# Patient Record
Sex: FEMALE | Race: WHITE | NOT HISPANIC OR LATINO | Employment: OTHER | URBAN - METROPOLITAN AREA
[De-identification: names, ages, dates, MRNs, and addresses within clinical notes are randomized per-mention and may not be internally consistent; named-entity substitution may affect disease eponyms.]

---

## 2017-01-20 ENCOUNTER — HOSPITAL ENCOUNTER (OUTPATIENT)
Dept: RADIOLOGY | Facility: HOSPITAL | Age: 67
Discharge: HOME | End: 2017-01-20
Attending: INTERNAL MEDICINE
Payer: OTHER GOVERNMENT

## 2017-01-20 ENCOUNTER — OFFICE VISIT (OUTPATIENT)
Dept: INTERNAL MEDICINE | Facility: CLINIC | Age: 67
End: 2017-01-20
Payer: OTHER GOVERNMENT

## 2017-01-20 VITALS
HEART RATE: 77 BPM | DIASTOLIC BLOOD PRESSURE: 82 MMHG | WEIGHT: 203.5 LBS | HEIGHT: 67 IN | TEMPERATURE: 98 F | BODY MASS INDEX: 31.94 KG/M2 | OXYGEN SATURATION: 97 % | SYSTOLIC BLOOD PRESSURE: 160 MMHG

## 2017-01-20 DIAGNOSIS — M77.8 TENDONITIS OF FINGER: Primary | ICD-10-CM

## 2017-01-20 DIAGNOSIS — M77.8 TENDONITIS OF FINGER: ICD-10-CM

## 2017-01-20 PROCEDURE — 99214 OFFICE O/P EST MOD 30 MIN: CPT | Mod: PBBFAC | Performed by: INTERNAL MEDICINE

## 2017-01-20 PROCEDURE — 99213 OFFICE O/P EST LOW 20 MIN: CPT | Mod: S$PBB,,, | Performed by: INTERNAL MEDICINE

## 2017-01-20 PROCEDURE — 73130 X-RAY EXAM OF HAND: CPT | Mod: TC,LT

## 2017-01-20 PROCEDURE — 99999 PR PBB SHADOW E&M-EST. PATIENT-LVL IV: CPT | Mod: PBBFAC,,, | Performed by: INTERNAL MEDICINE

## 2017-01-20 PROCEDURE — 73130 X-RAY EXAM OF HAND: CPT | Mod: 26,LT,, | Performed by: RADIOLOGY

## 2017-01-20 RX ORDER — OXYCODONE AND ACETAMINOPHEN 10; 325 MG/1; MG/1
1 TABLET ORAL EVERY 4 HOURS PRN
Qty: 30 TABLET | Refills: 0 | Status: SHIPPED | OUTPATIENT
Start: 2017-01-20 | End: 2017-01-30

## 2017-01-20 NOTE — PROGRESS NOTES
Subjective:       Patient ID: Soumya Stoll is a 66 y.o. female.    Chief Complaint: Tendonitis (in thumb)    Tendonitis   This is a recurrent problem. The current episode started in the past 7 days. The problem occurs 2 to 4 times per day. The problem has been gradually worsening. Pertinent negatives include no abdominal pain, arthralgias, chest pain, chills, congestion, coughing, fatigue, fever, headaches, nausea, rash, sore throat, vomiting or weakness. Exacerbated by: gripping with left hand. She has tried nothing for the symptoms. The treatment provided no relief.     Review of Systems   Constitutional: Negative for activity change, chills, fatigue and fever.   HENT: Negative for congestion, ear pain, nosebleeds, postnasal drip, sinus pressure and sore throat.    Eyes: Negative.  Negative for visual disturbance.   Respiratory: Negative for cough, chest tightness, shortness of breath and wheezing.    Cardiovascular: Negative for chest pain.   Gastrointestinal: Negative for abdominal pain, diarrhea, nausea and vomiting.   Genitourinary: Negative for difficulty urinating, dysuria, frequency and urgency.   Musculoskeletal: Negative for arthralgias and neck stiffness.   Skin: Negative for rash.   Neurological: Negative for dizziness, weakness and headaches.   Psychiatric/Behavioral: Negative for sleep disturbance. The patient is not nervous/anxious.        Objective:      Physical Exam   Musculoskeletal:        Hands:      Assessment:       1. Tendonitis of finger        Plan:   Soumya was seen today for tendonitis.    Diagnoses and all orders for this visit:    Tendonitis of finger  -     Ambulatory consult to Orthopedics  -     X-Ray Hand 3 view Left; Future    Other orders  -     oxycodone-acetaminophen (PERCOCET)  mg per tablet; Take 1 tablet by mouth every 4 (four) hours as needed for Pain.

## 2017-01-20 NOTE — MR AVS SNAPSHOT
[facility]  [address]  [facility] [address]  Phone: [phone]  Fax: [phone]                  Soumya Stoll   2017 12:00 PM   Office Visit    Description:  Female : 1950   Provider:  Laurence Meredith MD   Department:  [facility] Medicine           Reason for Visit     Tendonitis           Diagnoses this Visit        Comments    Tendonitis of finger    -  Primary            To Do List           Future Appointments        Provider Department Dept Phone    2017 1:30 PM [facility] [id number] [address] [facility] [phone]    2017 1:00 PM Sherwin Woodson Jr., MD [facility] [phone]      Goals (5 Years of Data)     None       These Medications        Disp Refills Start End    oxycodone-acetaminophen (PERCOCET)  mg per tablet 30 tablet 0 2017    Take 1 tablet by mouth every 4 (four) hours as needed for Pain. - Oral    Pharmacy: [facility] [address] Ph #: [phone]         [facility]     [facility] Care Line -  Assistance  Registered nurses in the [facility] provide clinical advisement, health education, appointment booking, and other advisory services.  Call for this free service at [phone].             Medications           Message regarding Medications     Verify the changes and/or additions to your medication regime listed below are the same as discussed with your clinician today.  If any of these changes or additions are incorrect, please notify your healthcare provider.        START taking these NEW medications        Refills    oxycodone-acetaminophen (PERCOCET)  mg per tablet 0    Sig: Take 1 tablet by mouth every 4 (four) hours as needed for Pain.    Class: Normal    Route: Oral      STOP taking these medications     hydrocodone-acetaminophen 7.5-325mg (NORCO) 7.5-325 mg per tablet Take 1  "tablet by mouth every 6 (six) hours as needed for Pain.           Verify that the below list of medications is an accurate representation of the medications you are currently taking.  If none reported, the list may be blank. If incorrect, please contact your healthcare provider. Carry this list with you in case of emergency.           Current Medications     aspirin (ECOTRIN) 325 MG EC tablet Take 1 tablet (325 mg total) by mouth 2 (two) times daily.    metoprolol succinate (TOPROL-XL) 50 MG 24 hr tablet Take 1 tablet (50 mg total) by mouth 2 (two) times daily.    clobetasol (TEMOVATE) 0.05 % external solution As needed for itching.    clotrimazole-betamethasone 1-0.05% (LOTRISONE) cream Apply topically 2 (two) times daily.    oxycodone-acetaminophen (PERCOCET)  mg per tablet Take 1 tablet by mouth every 4 (four) hours as needed for Pain.           Clinical Reference Information           Vital Signs - Last Recorded  Most recent update: 1/20/2017 12:01 PM by Matias Day LPN    BP Pulse Temp Ht Wt SpO2    (!) 160/82 (BP Location: Left arm, Patient Position: Sitting, BP Method: Manual) 77 97.9 °F (36.6 °C) (Oral) 5' 6.5" (1.689 m) 92.3 kg (203 lb 7.8 oz) 97%    BMI                32.35 kg/m2          Blood Pressure          Most Recent Value    BP  (!)  160/82      Allergies as of 1/20/2017     No Known Allergies      Immunizations Administered on Date of Encounter - 1/20/2017     None      Orders Placed During Today's Visit      Normal Orders This Visit    Ambulatory consult to Orthopedics     Future Labs/Procedures Expected by Expires    X-Ray Hand 3 view Left  1/20/2017 4/20/2017      "

## 2017-01-23 ENCOUNTER — OFFICE VISIT (OUTPATIENT)
Dept: ORTHOPEDICS | Facility: CLINIC | Age: 67
End: 2017-01-23
Payer: OTHER GOVERNMENT

## 2017-01-23 VITALS — HEIGHT: 67 IN | WEIGHT: 203 LBS | BODY MASS INDEX: 31.86 KG/M2

## 2017-01-23 DIAGNOSIS — M18.0 PRIMARY OSTEOARTHRITIS OF BOTH FIRST CARPOMETACARPAL JOINTS: Primary | ICD-10-CM

## 2017-01-23 PROCEDURE — 20600 DRAIN/INJ JOINT/BURSA W/O US: CPT | Mod: FA,S$PBB,, | Performed by: ORTHOPAEDIC SURGERY

## 2017-01-23 PROCEDURE — 20600 DRAIN/INJ JOINT/BURSA W/O US: CPT | Mod: PBBFAC,PO | Performed by: ORTHOPAEDIC SURGERY

## 2017-01-23 PROCEDURE — 99203 OFFICE O/P NEW LOW 30 MIN: CPT | Mod: 25,S$PBB,, | Performed by: ORTHOPAEDIC SURGERY

## 2017-01-23 PROCEDURE — 99999 PR PBB SHADOW E&M-EST. PATIENT-LVL II: CPT | Mod: PBBFAC,,, | Performed by: ORTHOPAEDIC SURGERY

## 2017-01-23 PROCEDURE — 99212 OFFICE O/P EST SF 10 MIN: CPT | Mod: PBBFAC,PO | Performed by: ORTHOPAEDIC SURGERY

## 2017-01-23 RX ORDER — TRIAMCINOLONE ACETONIDE 40 MG/ML
40 INJECTION, SUSPENSION INTRA-ARTICULAR; INTRAMUSCULAR
Status: COMPLETED | OUTPATIENT
Start: 2017-01-23 | End: 2017-01-23

## 2017-01-23 RX ADMIN — TRIAMCINOLONE ACETONIDE 40 MG: 40 INJECTION, SUSPENSION INTRA-ARTICULAR; INTRAMUSCULAR at 01:01

## 2017-01-23 NOTE — Clinical Note
January 23, 2017      Laurence Meredith MD  [address]  [facility] [address]           [facility]  [address] [place] [address] [address]  [facility] [address]  Phone: [phone]          Patient: Soumya Stoll   MR Number: [id number]   YOB: 1950   Date of Visit: 1/23/2017       Dear Dr. Laurence Meredith:    Thank you for referring Soumya Stoll to me for evaluation. Attached you will find relevant portions of my assessment and plan of care.    If you have questions, please do not hesitate to call me. I look forward to following Soumya Stoll along with you.    Sincerely,    Sherwin Woodson Jr., MD    Enclosure  CC:  No Recipients    If you would like to receive this communication electronically, please contact [email or web address] or [phone] to request more information on [employer] Link access.    For providers and/or their staff who would like to refer a patient to [facility], please contact us through our one-stop-shop provider referral line, [facility] , at [phone].    If you feel you have received this communication in error or would no longer like to receive these types of communications, please e-mail [email or web address]

## 2017-01-23 NOTE — LETTER
January 23, 2017        Laurence Meredith MD  [address]  [facility] [address]             [facility]  [address] [place] [address] [address]  [facility] [address]  Phone: [phone]   Patient: Soumya Stoll   MR Number: [id number]   YOB: 1950   Date of Visit: 1/23/2017       Dear Dr. Meredith:    Thank you for referring Soumya Stoll to me for evaluation. Below are the relevant portions of my assessment and plan of care.            If you have questions, please do not hesitate to call me. I look forward to following Soumya along with you.    Sincerely,      Sherwin Woodson Jr., MD           CC  No Recipients

## 2017-01-23 NOTE — PROGRESS NOTES
INITIAL VISIT HISTORY:  A 66-year-old female presents for evaluation of   bilateral hand pain, worse on the left hand compared to the right.  She   localizes most of the pain to the base of the left thumb.  Symptoms are worse   with use, they have been present for the past several months.  No numbness or   tingling is reported.  No trauma reported.    PAST MEDICAL HISTORY:  Significant for diverticulosis, hyperlipidemia,   hypertension.    PAST SURGICAL HISTORY:  Includes total hip replacement, hip arthroscopy,   colonoscopy, partial knee replacement and shoulder surgery, right.    FAMILY HISTORY:  Positive for colon cancer, hyperlipidemia and hypertension.    SOCIAL HISTORY:  The patient does not smoke, drinks one beer per day.    REVIEW OF SYSTEMS:  Negative for fever, chills, rashes.    CURRENT MEDICATIONS:  Reviewed and on chart.    ALLERGIES:  None.    PHYSICAL EXAMINATION:  GENERAL:  Well-developed, well-nourished female in no acute distress, alert and   oriented x3.  EXTREMITIES:  Examination of the upper extremities significant for the hands,   demonstrating tenderness at the base of each thumb, left worse than right.    Positive grind test on the left, negative on the right.  No crepitation either   hand.  Range of motion in wrist and fingers full, bilateral.  Tinel sign is   negative bilateral.  Phalen test negative.  Skin color and temperature normal.    Sensation intact in all digits.   strength slightly decreased on the left.    X-RAYS:  AP and lateral, left hand demonstrate arthritic change at the base of   the left thumb at the CMC joint.    IMPRESSION:  Left thumb carpometacarpal arthritis, moderate.    PLAN:  I explained the nature of problem to the patient.  Recommended and   performed an injection at the base of the left thumb with combination of Kenalog   10 mg, 0.5 mL Xylocaine, sterile technique.  I have also fitted her for a thumb   wrap for support.  Recommended anti-inflammatory  medication by mouth.  Follow   up in one to two months for recheck.      AJYDON/NEFTALI  dd: 01/23/2017 13:20:32 (CST)  td: 01/24/2017 04:54:33 (CST)  Doc ID   #[id number]  Job ID #[id number]    CC:

## 2017-02-25 ENCOUNTER — HOSPITAL ENCOUNTER (EMERGENCY)
Facility: HOSPITAL | Age: 67
Discharge: HOME | End: 2017-02-25
Attending: EMERGENCY MEDICINE
Payer: OTHER GOVERNMENT

## 2017-02-25 VITALS
SYSTOLIC BLOOD PRESSURE: 190 MMHG | DIASTOLIC BLOOD PRESSURE: 90 MMHG | HEIGHT: 66 IN | HEART RATE: 73 BPM | TEMPERATURE: 98 F | OXYGEN SATURATION: 100 % | BODY MASS INDEX: 32.14 KG/M2 | WEIGHT: 200 LBS | RESPIRATION RATE: 18 BRPM

## 2017-02-25 DIAGNOSIS — K57.92 DIVERTICULITIS OF INTESTINE WITHOUT PERFORATION OR ABSCESS WITHOUT BLEEDING, UNSPECIFIED PART OF INTESTINAL TRACT: Primary | ICD-10-CM

## 2017-02-25 LAB
ALBUMIN SERPL BCP-MCNC: 4 G/DL
ALP SERPL-CCNC: 122 U/L
ALT SERPL W/O P-5'-P-CCNC: 36 U/L
ANION GAP SERPL CALC-SCNC: 9 MMOL/L
AST SERPL-CCNC: 20 U/L
BASOPHILS # BLD AUTO: 0.02 K/UL
BASOPHILS NFR BLD: 0.3 %
BILIRUB SERPL-MCNC: 1 MG/DL
BILIRUB UR QL STRIP: NEGATIVE
BUN SERPL-MCNC: 9 MG/DL
CALCIUM SERPL-MCNC: 9.4 MG/DL
CHLORIDE SERPL-SCNC: 106 MMOL/L
CLARITY UR REFRACT.AUTO: CLEAR
CO2 SERPL-SCNC: 25 MMOL/L
COLOR UR AUTO: YELLOW
CREAT SERPL-MCNC: 0.8 MG/DL
DIFFERENTIAL METHOD: ABNORMAL
EOSINOPHIL # BLD AUTO: 0 K/UL
EOSINOPHIL NFR BLD: 0.4 %
ERYTHROCYTE [DISTWIDTH] IN BLOOD BY AUTOMATED COUNT: 13.5 %
EST. GFR  (AFRICAN AMERICAN): >60 ML/MIN/1.73 M^2
EST. GFR  (NON AFRICAN AMERICAN): >60 ML/MIN/1.73 M^2
GLUCOSE SERPL-MCNC: 114 MG/DL
GLUCOSE UR QL STRIP: NEGATIVE
HCT VFR BLD AUTO: 44.7 %
HGB BLD-MCNC: 15.2 G/DL
HGB UR QL STRIP: NEGATIVE
KETONES UR QL STRIP: NEGATIVE
LEUKOCYTE ESTERASE UR QL STRIP: NEGATIVE
LIPASE SERPL-CCNC: 15 U/L
LYMPHOCYTES # BLD AUTO: 1.7 K/UL
LYMPHOCYTES NFR BLD: 20.9 %
MCH RBC QN AUTO: 31.9 PG
MCHC RBC AUTO-ENTMCNC: 34 %
MCV RBC AUTO: 94 FL
MONOCYTES # BLD AUTO: 0.6 K/UL
MONOCYTES NFR BLD: 7.9 %
NEUTROPHILS # BLD AUTO: 5.6 K/UL
NEUTROPHILS NFR BLD: 70.2 %
NITRITE UR QL STRIP: NEGATIVE
PH UR STRIP: 5 [PH] (ref 5–8)
PLATELET # BLD AUTO: 241 K/UL
PMV BLD AUTO: 10.5 FL
POTASSIUM SERPL-SCNC: 4.3 MMOL/L
PROT SERPL-MCNC: 7.8 G/DL
PROT UR QL STRIP: NEGATIVE
RBC # BLD AUTO: 4.76 M/UL
SODIUM SERPL-SCNC: 140 MMOL/L
SP GR UR STRIP: 1 (ref 1–1.03)
URN SPEC COLLECT METH UR: NORMAL
UROBILINOGEN UR STRIP-ACNC: NEGATIVE EU/DL
WBC # BLD AUTO: 7.96 K/UL

## 2017-02-25 PROCEDURE — 83690 ASSAY OF LIPASE: CPT

## 2017-02-25 PROCEDURE — 99284 EMERGENCY DEPT VISIT MOD MDM: CPT

## 2017-02-25 PROCEDURE — 25000003 PHARM REV CODE 250: Performed by: PHYSICIAN ASSISTANT

## 2017-02-25 PROCEDURE — 99284 EMERGENCY DEPT VISIT MOD MDM: CPT | Mod: ,,, | Performed by: PHYSICIAN ASSISTANT

## 2017-02-25 PROCEDURE — 85025 COMPLETE CBC W/AUTO DIFF WBC: CPT

## 2017-02-25 PROCEDURE — 80053 COMPREHEN METABOLIC PANEL: CPT

## 2017-02-25 PROCEDURE — 81003 URINALYSIS AUTO W/O SCOPE: CPT

## 2017-02-25 RX ORDER — CIPROFLOXACIN 500 MG/1
500 TABLET ORAL 2 TIMES DAILY
Qty: 20 TABLET | Refills: 0 | Status: SHIPPED | OUTPATIENT
Start: 2017-02-25 | End: 2017-03-07

## 2017-02-25 RX ORDER — ONDANSETRON 4 MG/1
4 TABLET, ORALLY DISINTEGRATING ORAL EVERY 8 HOURS PRN
Qty: 12 TABLET | Refills: 0 | Status: SHIPPED | OUTPATIENT
Start: 2017-02-25 | End: 2017-04-19

## 2017-02-25 RX ORDER — METRONIDAZOLE 500 MG/1
500 TABLET ORAL 3 TIMES DAILY
Qty: 21 TABLET | Refills: 0 | Status: SHIPPED | OUTPATIENT
Start: 2017-02-25 | End: 2017-03-07

## 2017-02-25 RX ORDER — IBUPROFEN 600 MG/1
600 TABLET ORAL
Status: COMPLETED | OUTPATIENT
Start: 2017-02-25 | End: 2017-02-25

## 2017-02-25 RX ADMIN — IBUPROFEN 600 MG: 600 TABLET, FILM COATED ORAL at 12:02

## 2017-02-25 NOTE — ED AVS SNAPSHOT
[facility]  [address] [facility] [address]               Soumya Stoll   2017 10:53 AM   ED    Description:  Female : 1950   Department:  [facility]           Your Care was Coordinated By:     Provider Role From To    Remigio Westbrook III, MD Attending Provider 17 [id number] --    TIANA Santos Physician Assistant 17 [id number] --      Reason for Visit     Diverticulitis           Diagnoses this Visit        Comments    Diverticulitis of intestine without perforation or abscess without bleeding, unspecified part of intestinal tract    -  Primary       ED Disposition     None           To Do List           Follow-up Information     Schedule an appointment as soon as possible for a visit with Lily Dhaliwal MD.    Specialty:  Internal Medicine    Contact information:    [address]  [place] [address]  [phone]          Schedule an appointment as soon as possible for a visit with [facility].    Specialty:  Colon and Rectal Surgery    Contact information:    [address] [facility] [address] [phone]    Additional information:    [facility] - 4th Floor       These Medications        Disp Refills Start End    ciprofloxacin HCl (CIPRO) 500 MG tablet 20 tablet 0 2017 3/7/2017    Take 1 tablet (500 mg total) by mouth 2 (two) times daily. - Oral    Pharmacy: [facility] [address] [place] [address] Ph #: [phone]       metronidazole (FLAGYL) 500 MG tablet 21 tablet 0 2017 3/7/2017    Take 1 tablet (500 mg total) by mouth 3 (three) times daily. - Oral    Pharmacy: [facility] [place], [place] - [address] [place] [address] Ph #: [phone]       ondansetron (ZOFRAN-ODT) 4 MG TbDL 12 tablet 0 2017     Take 1 tablet (4 mg total) by mouth every 8 (eight) hours as needed (nausea). - Oral    Pharmacy: [facility]  [place], [place] - [address] Ph #: [phone]         [facility]     [facility] Nurse [facility] - 24/7 Assistance  Registered nurses in the [facility] provide clinical advisement, health education, appointment booking, and other advisory services.  Call for this free service at [phone].             Medications           Message regarding Medications     Verify the changes and/or additions to your medication regime listed below are the same as discussed with your clinician today.  If any of these changes or additions are incorrect, please notify your healthcare provider.        START taking these NEW medications        Refills    ciprofloxacin HCl (CIPRO) 500 MG tablet 0    Sig: Take 1 tablet (500 mg total) by mouth 2 (two) times daily.    Class: Print    Route: Oral    metronidazole (FLAGYL) 500 MG tablet 0    Sig: Take 1 tablet (500 mg total) by mouth 3 (three) times daily.    Class: Print    Route: Oral    ondansetron (ZOFRAN-ODT) 4 MG TbDL 0    Sig: Take 1 tablet (4 mg total) by mouth every 8 (eight) hours as needed (nausea).    Class: Print    Route: Oral      These medications were administered today        Dose Freq    ibuprofen tablet 600 mg 600 mg ED 1 Time    Sig: Take 1 tablet (600 mg total) by mouth ED 1 Time.    Class: Normal    Route: Oral           Verify that the below list of medications is an accurate representation of the medications you are currently taking.  If none reported, the list may be blank. If incorrect, please contact your healthcare provider. Carry this list with you in case of emergency.           Current Medications     metoprolol succinate (TOPROL-XL) 50 MG 24 hr tablet Take 1 tablet (50 mg total) by mouth 2 (two) times daily.    aspirin (ECOTRIN) 325 MG EC tablet Take 1 tablet (325 mg total) by mouth 2 (two) times daily.    ciprofloxacin HCl (CIPRO) 500 MG tablet Take 1 tablet (500 mg total) by mouth 2 (two) times daily.    metronidazole (FLAGYL)  500 MG tablet Take 1 tablet (500 mg total) by mouth 3 (three) times daily.    ondansetron (ZOFRAN-ODT) 4 MG TbDL Take 1 tablet (4 mg total) by mouth every 8 (eight) hours as needed (nausea).           Clinical Reference Information           Your Vitals Were     BP                   190/90 (BP Location: Left arm, Patient Position: Sitting)           Allergies as of 2/25/2017     No Known Allergies      Immunizations Administered on Date of Encounter - 2/25/2017     None      ED Micro, Lab, POCT     Start Ordered       Status Ordering Provider    02/25/17 1116 02/25/17 1115  CBC auto differential  STAT      Final result     02/25/17 1116 02/25/17 1115  Comprehensive metabolic panel  STAT      Final result     02/25/17 1116 02/25/17 1115  Lipase  STAT      Final result     02/25/17 1116 02/25/17 1115  Urinalysis  STAT      Final result       ED Imaging Orders     None        Discharge Instructions         Discharge Instructions for Diverticulitis  You have been diagnosed with diverticulitis. This is a condition in which small pouches form in your colon (large intestine) and become inflamed or infected. Follow the guidelines below for home care.  As you recover  Tips for recovery include:  · Eat a low-fiber diet. Your healthcare provider may advise a liquid diet. This gives your bowel a chance to rest so that it can recover.  · Foods to include: flake cereal, mashed potatoes, pancakes, waffles, pasta, white bread, rice, applesauce, bananas, eggs, fish, poultry, tofu, and well-cooked vegetables  · Take your medicines as directed. Do not stop taking the medicines, even if you feel better.  · Monitor your temperature and report any rising temperature to your healthcare provider.  · Take antibiotics exactly as directed. Do not miss any and keep taking them even if you feel better.   · Drink 6 to 8 glasses of water every day, unless directed otherwise.  · Use a heating pad or hot water bottle to reduce abdominal cramping or  pain.  Preventing diverticulitis in the future  Tips for prevention include:  · Eat a high-fiber diet. Fiber adds bulk to the stool so that it passes through the large intestine more easily.  · Keep drinking 6 to 8 glasses of water every day, unless directed otherwise.  · Begin an exercise program. Ask your healthcare provider how to get started. You can benefit from simple activities such as walking or gardening.  · Treat diarrhea with a bland diet. Start with liquids only, then slowly add fiber over time.  · Watch for changes in your bowel movements (constipation to diarrhea).  · Avoid constipation with fiber and add a stool softener if needed.   · Get plenty of rest and sleep.  Follow-up care  Make a follow-up appointment as directed by our staff.  When to call your healthcare provider  Call your healthcare provider immediately if you have any of the following:  · Fever of 100.4°F (38.0°C) or higher, or as directed by your healthcare provider  · Chills  · Severe cramps in the belly, most commonly the lower left side  · Tenderness in the belly, most commonly the lower left side  · Nausea and vomiting  · Bleeding from your rectum   Date Last Reviewed: 7/1/2016  © 3322-0166 [employer]. [address] [place] [address]. All rights reserved. This information is not intended as a substitute for professional medical care. Always follow your healthcare professional's instructions.          Your Scheduled Appointments     Mar 06, 2017  1:00 PM CST   Established Patient Visit with MD Joel Vilchis Jr. - Orthopedics (Joel)    [address] [place] [address] [address]  [facility] [id number] [phone]               [facility] complies with applicable Federal civil rights laws and does not discriminate on the basis of race, color, national origin, age, disability, or sex.        Language Assistance Services     ATTENTION: Language assistance services are available, free of  charge. Please call [phone].      ATENCIÓN: Si habla español, tiene a craig disposición servicios gratuitos de asistencia lingüística. Llame al [phone].     CHÚ Ý: N?u b?n nói Ti?ng Vi?t, có các d?ch v? h? tr? ngôn ng? mi?n phí dành cho b?n. G?i s? [phone].

## 2017-02-25 NOTE — ED TRIAGE NOTES
Pt presents with c/o diverticulitis with symptoms x 1 day. Pt reports abd pain to LLQ, stomach cramps and bloating. Pt denies N/V/D or fever. States she has not had a flare up in years. Pt has a normal BM today.

## 2017-02-25 NOTE — ED PROVIDER NOTES
Encounter Date: 2/25/2017    SCRIBE #1 NOTE: I, Cecilio Pressley, am scribing for, and in the presence of,  Remigio Westbrook MD. I have scribed the following portions of the note - the APC attestation.       History     Chief Complaint   Patient presents with    Diverticulitis     c/o diverticulitis     Review of patient's allergies indicates:  No Known Allergies  HPI Comments: This is a 66-year-old female with a past medical history of hypertension, hyperlipidemia, diverticulosis who presents to the ED with a chief complaint of abdominal pain.  Patient reports gradual onset of cramping 4/10 left lower quadrant abdominal pain yesterday morning.  The pain has been constant since the onset and is nonradiating. She reports having a normal formed BM this morning. Patient denies fever, chills, chest pain, shortness of breath, nausea/vomiting, appetite changes, dysuria, hematuria, frequency, difficulty urinating, diarrhea, melena or BRBPR.    Patient has a history of colonoscopy revealing diverticulosis, last performed 5 years ago.  She has had one episode of diverticulitis in the past.  Patient reports a travel history to [place] and [place] in the last month.  No recent antibiotic use.    The history is provided by the patient.     Past Medical History:   Diagnosis Date    Diverticulosis     Fever blister     Hyperlipidemia     Hypertension     IGT (impaired glucose tolerance) 4/1/2016    Keloid cicatrix      Past Surgical History:   Procedure Laterality Date    COLONOSCOPY  2013    repeat in 5 yrs    FOOT NEUROMA SURGERY Left     HIP ARTHROSCOPY W/ LABRAL DEBRIDEMENT Left 2008    JOINT REPLACEMENT Right     2014    partial knee replacement Left     SHOULDER SURGERY      TOTAL HIP ARTHROPLASTY Right 2013     Family History   Problem Relation Age of Onset    Cancer Father 70     colon CA    Hypertension Father     Hyperlipidemia Father     Cancer Paternal Aunt 75     COLON CA    Cancer Maternal Grandmother       colon CA    Cancer Sister 55     breast CA    Cancer Brother 45     colon CA    Ovarian cancer Neg Hx     Colon cancer Neg Hx      Social History   Substance Use Topics    Smoking status: Never Smoker    Smokeless tobacco: None    Alcohol use 0.0 oz/week     0 Standard drinks or equivalent per week      Comment: daily 2 beers and 1 glass of wine     Review of Systems   Constitutional: Negative for chills and fever.   HENT: Negative for sore throat and voice change.    Respiratory: Negative for shortness of breath.    Cardiovascular: Negative for chest pain.   Gastrointestinal: Positive for abdominal pain (left lower quadrant). Negative for constipation, diarrhea, nausea and vomiting.   Genitourinary: Negative for dysuria.   Musculoskeletal: Negative for arthralgias, back pain and myalgias.   Skin: Negative for rash.   Neurological: Negative for dizziness, weakness and light-headedness.   Hematological: Does not bruise/bleed easily.       Physical Exam   Initial Vitals   BP Pulse Resp Temp SpO2   02/25/17 0857 02/25/17 0857 02/25/17 0857 02/25/17 0857 02/25/17 0857   190/90 73 18 98 °F (36.7 °C) 100 %     Physical Exam    Constitutional: She appears well-developed and well-nourished. No distress.   HENT:   Head: Atraumatic.   Eyes: Conjunctivae and EOM are normal. Pupils are equal, round, and reactive to light.   Neck: Normal range of motion. Neck supple.   Cardiovascular: Normal rate, regular rhythm and normal heart sounds.   Pulmonary/Chest: Breath sounds normal. No respiratory distress. She has no wheezes. She has no rhonchi. She has no rales.   Abdominal: Soft. Bowel sounds are normal. She exhibits distension (mild). There is tenderness in the right lower quadrant and left lower quadrant. There is no rigidity, no rebound, no guarding and no CVA tenderness.   Neurological: She is alert and oriented to person, place, and time.   Skin: Skin is warm and dry. No rash noted.         ED Course    Procedures  Labs Reviewed   CBC W/ AUTO DIFFERENTIAL - Abnormal; Notable for the following:        Result Value    MCH 31.9 (*)     All other components within normal limits   COMPREHENSIVE METABOLIC PANEL - Abnormal; Notable for the following:     Glucose 114 (*)     All other components within normal limits   LIPASE   URINALYSIS        Imaging Results     None             Medical Decision Making:   History:   Old Medical Records: I decided to obtain old medical records.  Clinical Tests:   Lab Tests: Ordered and Reviewed       APC / Resident Notes:   66-year-old female presents with left lower quadrant abdominal pain.  On exam she is afebrile and nontoxic.  Abdomen is soft, mildly distended, and tender in the right and left lower quadrants.  There is no rebound, guarding, or rigidity.  No CVA tenderness.  Patient is well-appearing and in no acute distress.    DDX includes but is not limited to diverticulitis, UTI, gastroenteritis.    Discussed workup with patient including labs and recommended CT abdomen and pelvis.  Patient prefers a more conservative approach and would like to decline imaging at this time.  Feel that given patient's stable clinical appearance this is reasonable.  I will check labs and start on Cipro and Flagyl.      Labs with normal WBC, electrolytes, and renal function. Urinalysis benign.    Discussed with the patient that if she should not improve or if she has any worsening symptoms she will need to return promptly for further evaluation.  Patient agrees with the plan and course of treatment.  She verbalized understanding and agreement to return for worsening symptoms. I discussed the care of this patient with my supervising MD.        Scribe Attestation:   Scribe #1: I performed the above scribed service and the documentation accurately describes the services I performed. I attest to the accuracy of the note.    Attending Attestation:     Physician Attestation Statement for NP/PA:   I  discussed this assessment and plan of this patient with the NP/PA, but I did not personally examine the patient. The face to face encounter was performed by the NP/PA.    Other NP/PA Attestation Additions:    History of Present Illness: LLQ abdominal pain         Physician Attestation for Scribe:  Physician Attestation Statement for Scribe #1: I, Remigio Westbrook MD, reviewed documentation, as scribed by Cecilio Pressley in my presence, and it is both accurate and complete.                 ED Course     Clinical Impression:   The encounter diagnosis was Diverticulitis of intestine without perforation or abscess without bleeding, unspecified part of intestinal tract.    Disposition:   Disposition: Discharged  Condition: Stable       TIANA Santos  02/25/17 9996       Remigio Westbrook III, MD  02/25/17 3730

## 2017-02-25 NOTE — ED NOTES
LOC: The patient is awake and alert; oriented x 3 and speaking appropriately.  APPEARANCE: Patient resting comfortably, patient is clean and well groomed  SKIN: warm and dry, normal skin turgor & moist mucus membranes, skin intact, no breakdown noted.  MUSCULOSKELETAL: Patient moving all extremities well, no obvious swelling or deformities noted  RESPIRATORY: Airway is open and patent, breath sounds clear throughout all lung fields; respirations are spontaneous, normal effort and rate  CARDIAC: Patient has a normal rate, no peripheral edema noted, capillary refill < 3 seconds; No complaints of chest pain   ABDOMEN: Soft and tender to palpation LLQ, distention noted. Bowel sounds present x 4. Pt reports last BM was today and normal.

## 2017-03-01 ENCOUNTER — PATIENT MESSAGE (OUTPATIENT)
Dept: INTERNAL MEDICINE | Facility: CLINIC | Age: 67
End: 2017-03-01

## 2017-03-01 DIAGNOSIS — Z12.11 COLON CANCER SCREENING: Primary | ICD-10-CM

## 2017-03-01 DIAGNOSIS — Z87.19 HISTORY OF DIVERTICULITIS: ICD-10-CM

## 2017-03-27 RX ORDER — METOPROLOL SUCCINATE 50 MG/1
TABLET, EXTENDED RELEASE ORAL
Qty: 180 TABLET | Refills: 3 | Status: SHIPPED | OUTPATIENT
Start: 2017-03-27 | End: 2017-04-03 | Stop reason: SDUPTHER

## 2017-04-03 RX ORDER — METOPROLOL SUCCINATE 50 MG/1
TABLET, EXTENDED RELEASE ORAL
Qty: 180 TABLET | Refills: 3 | Status: SHIPPED | OUTPATIENT
Start: 2017-04-03 | End: 2017-05-01 | Stop reason: SDUPTHER

## 2017-04-03 RX ORDER — METOPROLOL SUCCINATE 50 MG/1
50 TABLET, EXTENDED RELEASE ORAL 2 TIMES DAILY
Qty: 180 TABLET | Refills: 3 | Status: CANCELLED | OUTPATIENT
Start: 2017-04-03

## 2017-04-19 ENCOUNTER — OFFICE VISIT (OUTPATIENT)
Dept: INTERNAL MEDICINE | Facility: CLINIC | Age: 67
End: 2017-04-19
Payer: OTHER GOVERNMENT

## 2017-04-19 VITALS
BODY MASS INDEX: 32.66 KG/M2 | WEIGHT: 203.25 LBS | SYSTOLIC BLOOD PRESSURE: 135 MMHG | HEIGHT: 66 IN | TEMPERATURE: 98 F | HEART RATE: 69 BPM | DIASTOLIC BLOOD PRESSURE: 83 MMHG | RESPIRATION RATE: 17 BRPM

## 2017-04-19 DIAGNOSIS — Z12.11 COLON CANCER SCREENING: ICD-10-CM

## 2017-04-19 DIAGNOSIS — Z00.00 ANNUAL PHYSICAL EXAM: Primary | ICD-10-CM

## 2017-04-19 DIAGNOSIS — M25.511 CHRONIC PAIN OF BOTH SHOULDERS: ICD-10-CM

## 2017-04-19 DIAGNOSIS — G89.29 CHRONIC PAIN OF BOTH SHOULDERS: ICD-10-CM

## 2017-04-19 DIAGNOSIS — I10 BENIGN ESSENTIAL HYPERTENSION: ICD-10-CM

## 2017-04-19 DIAGNOSIS — E78.5 HYPERLIPIDEMIA, UNSPECIFIED HYPERLIPIDEMIA TYPE: ICD-10-CM

## 2017-04-19 DIAGNOSIS — M25.512 CHRONIC PAIN OF BOTH SHOULDERS: ICD-10-CM

## 2017-04-19 DIAGNOSIS — M79.675 TOE PAIN, LEFT: ICD-10-CM

## 2017-04-19 DIAGNOSIS — G47.00 INSOMNIA, UNSPECIFIED TYPE: ICD-10-CM

## 2017-04-19 PROCEDURE — 99999 PR PBB SHADOW E&M-EST. PATIENT-LVL IV: CPT | Mod: PBBFAC,,, | Performed by: INTERNAL MEDICINE

## 2017-04-19 PROCEDURE — 99396 PREV VISIT EST AGE 40-64: CPT | Mod: S$PBB,,, | Performed by: INTERNAL MEDICINE

## 2017-04-19 PROCEDURE — 99214 OFFICE O/P EST MOD 30 MIN: CPT | Mod: PBBFAC | Performed by: INTERNAL MEDICINE

## 2017-04-19 RX ORDER — AMITRIPTYLINE HYDROCHLORIDE 10 MG/1
10 TABLET, FILM COATED ORAL NIGHTLY PRN
Qty: 90 TABLET | Refills: 0 | Status: SHIPPED | OUTPATIENT
Start: 2017-04-19 | End: 2017-05-18 | Stop reason: SDUPTHER

## 2017-04-19 NOTE — PROGRESS NOTES
"Subjective:       Patient ID: Soumya Stoll is a 66 y.o. female.    Chief Complaint: annual     HPI annual exam    Was having R knee pain when she made the appt. However the knee pain has improved just by laying off of it and stretching. Doesn't want knee replacement.     C/o L toe pain by the bunion. When she dorsiflexes the L big toe, pain in the 1st metatarsal area. Walks a little every day.    HTN on toprol XL 50mg bid. Does not check BP at home. Palpitations much improved.     Insomnia - last night woke up at 1am and then couldn't fall back asleep. Reports that her hips gets pain when she lays on her side. In the remote past, she tried amitriptyline, which works but caused her to be drowsy the next morning.     Previously w/ R shoulder arthroscopy. Feels much stronger. Now w/ L shoulder pain. Started lifting weights agin.    Review of Systems  Comprehensive review of systems otherwise negative. See history/subjective section for more details.    Reviewed HM.     Objective:      Physical Exam    /83  Pulse 69  Temp 98 °F (36.7 °C) (Oral)   Resp 17  Ht 5' 6" (1.676 m)  Wt 92.2 kg (203 lb 4.2 oz)  BMI 32.81 kg/m2    Gen - A+OX4, NAD  HEENT - PERRL, OP clear. MMM.   Neck - no LAD  CV - RRR  CHEST - CTAB, no wheezing/rhonchi/crackles  Abd - S/NT/ND/+BS  Ext -2 + B radial or PT pulses. No LE edema. Pain on palpation of the L bunion.   Neuro - 5/5 BUE and BLE strength. Normal sensation to light touch. 2+ DTRs.    Previous labs reviewed.     Assessment/Plan     Soumya was seen today for knee pain and toe pain.    Diagnoses and all orders for this visit:    Annual physical exam - refer to optometry for eye exam.  -     CBC auto differential; Future; Expected date: 4/19/17  -     Comprehensive metabolic panel; Future; Expected date: 4/19/17  -     Lipid panel; Future; Expected date: 4/19/17  -     TSH; Future; Expected date: 4/19/17  -     Hemoglobin A1c; Future; Expected date: 4/19/17  -     Vitamin D; " Future; Expected date: 4/19/17  -     High sensitivity CRP (Cardiac CRP); Future; Expected date: 4/19/17    Toe pain, left - conservative management. Likely 2/2 L foot bunion. Toe separator and cushion by L big toe bunion. Does not desire surgery at this time. Can take intermittent ibuprofen prn.    Insomnia, unspecified type - trial of elavil.   -     amitriptyline (ELAVIL) 10 MG tablet; Take 1 tablet (10 mg total) by mouth nightly as needed for Insomnia.    Chronic pain of both shoulders  -     amitriptyline (ELAVIL) 10 MG tablet; Take 1 tablet (10 mg total) by mouth nightly as needed for Insomnia.    Benign essential hypertension - Stable and controlled. Continue current medications. Also controlling palpitations.    Colon cancer screening  -     Case request GI: COLONOSCOPY    Hyperlipidemia, unspecified hyperlipidemia type - recheck FLP.       Return in about 3 months (around 7/19/2017).      Lily Dhaliwal MD  Department of Internal Medicine - [facility]  10:14 AM

## 2017-04-19 NOTE — MR AVS SNAPSHOT
[facility]  [address]  [facility] [address]  Phone: [phone]  Fax: [phone]                  Soumya Stoll   2017 10:00 AM   Office Visit    Description:  Female : 1950   Provider:  Lily Dhaliwal MD   Department:  [facility]           Reason for Visit     Knee Pain     Toe Pain           Diagnoses this Visit        Comments    Annual physical exam    -  Primary     Toe pain, left         Insomnia, unspecified type         Chronic pain of both shoulders         Benign essential hypertension         Colon cancer screening         Hyperlipidemia, unspecified hyperlipidemia type                To Do List           Future Appointments        Provider Department Dept Phone    2017 8:00 AM LAB, APPOINTMENT [place] [facility] [phone]    2017 9:15 AM Katie Wu OD [facility] - Optometry [phone]    2017 1:00 PM Clarke Bernstein MD [facility] - Cardiology [phone]      To Schedule:     Please call the Endoscopy Department at [phone] to schedule your appointment.          Goals (5 Years of Data)     None      Follow-Up and Disposition     Return in about 3 months (around 2017).       These Medications        Disp Refills Start End    amitriptyline (ELAVIL) 10 MG tablet 90 tablet 0 2017    Take 1 tablet (10 mg total) by mouth nightly as needed for Insomnia. - Oral    Pharmacy: [facility] - [address] #: [phone]         [facility] On Call     [facility] On Call Nurse Care Line -  Assistance  Unless otherwise directed by your provider, please contact [facility], our nurse care line that is available for  assistance.     Registered nurses in the [facility] provide: appointment scheduling, clinical advisement, health education, and other advisory services.  Call: [phone] (toll free)              "  Medications           Message regarding Medications     Verify the changes and/or additions to your medication regime listed below are the same as discussed with your clinician today.  If any of these changes or additions are incorrect, please notify your healthcare provider.        START taking these NEW medications        Refills    amitriptyline (ELAVIL) 10 MG tablet 0    Sig: Take 1 tablet (10 mg total) by mouth nightly as needed for Insomnia.    Class: Normal    Route: Oral      STOP taking these medications     aspirin (ECOTRIN) 325 MG EC tablet Take 1 tablet (325 mg total) by mouth 2 (two) times daily.    ondansetron (ZOFRAN-ODT) 4 MG TbDL Take 1 tablet (4 mg total) by mouth every 8 (eight) hours as needed (nausea).           Verify that the below list of medications is an accurate representation of the medications you are currently taking.  If none reported, the list may be blank. If incorrect, please contact your healthcare provider. Carry this list with you in case of emergency.           Current Medications     metoprolol succinate (TOPROL XL) 50 MG 24 hr tablet TAKE 1 TABLET TWICE A DAY    amitriptyline (ELAVIL) 10 MG tablet Take 1 tablet (10 mg total) by mouth nightly as needed for Insomnia.           Clinical Reference Information           Your Vitals Were     BP Pulse Temp Resp Height Weight    135/83 69 98 °F (36.7 °C) (Oral) 17 5' 6" (1.676 m) 92.2 kg (203 lb 4.2 oz)    BMI                32.81 kg/m2          Blood Pressure          Most Recent Value    BP  135/83      Allergies as of 4/19/2017     No Known Allergies      Immunizations Administered on Date of Encounter - 4/19/2017     None      Orders Placed During Today's Visit      Normal Orders This Visit    Ambulatory Referral to Optometry     Case request GI: COLONOSCOPY     Future Labs/Procedures Expected by Expires    CBC auto differential  4/19/2017 6/18/2018    Comprehensive metabolic panel  4/19/2017 6/18/2018    Hemoglobin A1c  " 4/19/2017 6/18/2018    High sensitivity CRP (Cardiac CRP)  4/19/2017 6/18/2018    Lipid panel  4/19/2017 6/18/2018    TSH  4/19/2017 6/18/2018    Vitamin D  4/19/2017 6/18/2018      Language Assistance Services     ATTENTION: Language assistance services are available, free of charge. Please call [phone].      ATENCIÓN: Si habla español, tiene a craig disposición servicios gratuitos de asistencia lingüística. Llame al [phone].     CHÚ Ý: N?u b?n nói Ti?ng Vi?t, có các d?ch v? h? tr? ngôn ng? mi?n phí dành cho b?n. G?i s? [phone].         [facility] complies with applicable Federal civil rights laws and does not discriminate on the basis of race, color, national origin, age, disability, or sex.

## 2017-04-20 ENCOUNTER — OFFICE VISIT (OUTPATIENT)
Dept: OPTOMETRY | Facility: CLINIC | Age: 67
End: 2017-04-20
Payer: OTHER GOVERNMENT

## 2017-04-20 DIAGNOSIS — Z98.890 STATUS POST LASIK SURGERY OF BOTH EYES: ICD-10-CM

## 2017-04-20 DIAGNOSIS — H52.03 HYPEROPIA, BILATERAL: ICD-10-CM

## 2017-04-20 DIAGNOSIS — H52.4 PRESBYOPIA: ICD-10-CM

## 2017-04-20 DIAGNOSIS — H25.13 NS (NUCLEAR SCLEROSIS), BILATERAL: ICD-10-CM

## 2017-04-20 DIAGNOSIS — I10 BENIGN ESSENTIAL HYPERTENSION: Primary | ICD-10-CM

## 2017-04-20 DIAGNOSIS — H04.123 DRY EYE SYNDROME, BILATERAL: ICD-10-CM

## 2017-04-20 PROCEDURE — 92015 DETERMINE REFRACTIVE STATE: CPT | Mod: ,,, | Performed by: OPTOMETRIST

## 2017-04-20 PROCEDURE — 99212 OFFICE O/P EST SF 10 MIN: CPT | Mod: PBBFAC | Performed by: OPTOMETRIST

## 2017-04-20 PROCEDURE — 92004 COMPRE OPH EXAM NEW PT 1/>: CPT | Mod: S$PBB,,, | Performed by: OPTOMETRIST

## 2017-04-20 PROCEDURE — 99999 PR PBB SHADOW E&M-EST. PATIENT-LVL II: CPT | Mod: PBBFAC,,, | Performed by: OPTOMETRIST

## 2017-04-20 NOTE — LETTER
April 20, 2017      Lily Dhaliwal MD  [address]  [facility] [address]           [facility]  [address]  [facility] [address]  Phone: [phone]  Fax: [phone]          Patient: Soumya Stoll   MR Number: [id number]   YOB: 1950   Date of Visit: 4/20/2017       Dear Dr. Lily Dhaliwal:    Thank you for referring Soumya Stoll to me for evaluation. Attached you will find relevant portions of my assessment and plan of care.    If you have questions, please do not hesitate to call me. I look forward to following Soumya Stoll along with you.    Sincerely,    Katie Wu, OD    Enclosure  CC:  No Recipients    If you would like to receive this communication electronically, please contact [email or web address] or [phone] to request more information on [employer] Link access.    For providers and/or their staff who would like to refer a patient to [facility], please contact us through our one-stop-shop provider referral line, [facility], at [phone].    If you feel you have received this communication in error or would no longer like to receive these types of communications, please e-mail [email or web address]

## 2017-04-27 ENCOUNTER — TELEPHONE (OUTPATIENT)
Dept: INTERNAL MEDICINE | Facility: CLINIC | Age: 67
End: 2017-04-27

## 2017-04-27 DIAGNOSIS — E78.2 MIXED HYPERLIPIDEMIA: Primary | ICD-10-CM

## 2017-04-27 RX ORDER — ROSUVASTATIN CALCIUM 20 MG/1
20 TABLET, COATED ORAL NIGHTLY
Qty: 90 TABLET | Refills: 3 | Status: SHIPPED | OUTPATIENT
Start: 2017-04-27 | End: 2017-05-01

## 2017-04-27 NOTE — TELEPHONE ENCOUNTER
Spoke with patient.  Will start Crestor 20 mg for hyperlipidemia.  A1c still in prediabetes range.  Increase exercise and decrease carbohydrate intake.

## 2017-04-29 ENCOUNTER — PATIENT MESSAGE (OUTPATIENT)
Dept: INTERNAL MEDICINE | Facility: CLINIC | Age: 67
End: 2017-04-29

## 2017-05-01 ENCOUNTER — OFFICE VISIT (OUTPATIENT)
Dept: CARDIOLOGY | Facility: CLINIC | Age: 67
End: 2017-05-01
Payer: OTHER GOVERNMENT

## 2017-05-01 VITALS
WEIGHT: 201.06 LBS | BODY MASS INDEX: 32.31 KG/M2 | DIASTOLIC BLOOD PRESSURE: 87 MMHG | HEIGHT: 66 IN | SYSTOLIC BLOOD PRESSURE: 134 MMHG | HEART RATE: 78 BPM

## 2017-05-01 DIAGNOSIS — Z87.898 HISTORY OF PALPITATIONS: Primary | ICD-10-CM

## 2017-05-01 DIAGNOSIS — E78.5 HYPERLIPIDEMIA, UNSPECIFIED HYPERLIPIDEMIA TYPE: ICD-10-CM

## 2017-05-01 DIAGNOSIS — I10 BENIGN ESSENTIAL HYPERTENSION: ICD-10-CM

## 2017-05-01 PROCEDURE — 99213 OFFICE O/P EST LOW 20 MIN: CPT | Mod: PBBFAC | Performed by: INTERNAL MEDICINE

## 2017-05-01 PROCEDURE — 99213 OFFICE O/P EST LOW 20 MIN: CPT | Mod: S$PBB,,, | Performed by: INTERNAL MEDICINE

## 2017-05-01 PROCEDURE — 99999 PR PBB SHADOW E&M-EST. PATIENT-LVL III: CPT | Mod: PBBFAC,,, | Performed by: INTERNAL MEDICINE

## 2017-05-01 RX ORDER — METOPROLOL SUCCINATE 100 MG/1
100 TABLET, EXTENDED RELEASE ORAL DAILY
Qty: 90 TABLET | Refills: 3 | Status: SHIPPED | OUTPATIENT
Start: 2017-05-01 | End: 2017-05-15 | Stop reason: SDUPTHER

## 2017-05-01 RX ORDER — ATORVASTATIN CALCIUM 40 MG/1
40 TABLET, FILM COATED ORAL DAILY
Qty: 90 TABLET | Refills: 3 | Status: SHIPPED | OUTPATIENT
Start: 2017-05-01 | End: 2018-05-01

## 2017-05-01 NOTE — PROGRESS NOTES
"Chart has been dictated using voice recognition software.  It is not been reviewed carefully for any transcriptional errors due to this technology.   Subjective:   Patient ID:  Soumya Stoll is a 66 y.o. female who presents for follow-up of Benign essential hypertension (1 yr fu)      HPI: Patient with history of diverticulosis, hypertension, hyperlipidemia, and palpitations. Patient still has palpitations lasting a minute occurring every week or 2. Patient denies any chest discomfort on exertion or at rest.  Patient denies any dyspnea at rest or on exertion, orthopnea, or PND. Patient denies any lightheadedness or syncope.     ASCVD 10 year risk =  8.4%  Recently given prescription for atorvastatin, but has not started yet.    Past Medical History:   Diagnosis Date    Diverticulosis     Fever blister     Hyperlipidemia     Hypertension     IGT (impaired glucose tolerance) 4/1/2016    Keloid cicatrix        Outpatient Medications Prior to Visit   Medication Sig Dispense Refill    amitriptyline (ELAVIL) 10 MG tablet Take 1 tablet (10 mg total) by mouth nightly as needed for Insomnia. 90 tablet 0    atorvastatin (LIPITOR) 40 MG tablet Take 1 tablet (40 mg total) by mouth once daily. 90 tablet 3    metoprolol succinate (TOPROL XL) 50 MG 24 hr tablet TAKE 1 TABLET TWICE A  tablet 3    rosuvastatin (CRESTOR) 20 MG tablet Take 1 tablet (20 mg total) by mouth every evening. 90 tablet 3     No facility-administered medications prior to visit.        ROS - Patient denies any palpitations, lightheadedness, or syncope.   Objective:   Physical Exam   Constitutional: She is oriented to person, place, and time. She appears well-developed and well-nourished.   /87 (BP Location: Left arm, Patient Position: Sitting, BP Method: Automatic)  Pulse 78  Ht 5' 6" (1.676 m)  Wt 91.2 kg (201 lb 1 oz)  BMI 32.45 kg/m2   Neck: Neck supple. No JVD present. Carotid bruit is not present. No thyromegaly present. "   Cardiovascular: Normal rate, regular rhythm, S1 normal, S2 normal and intact distal pulses.  Exam reveals S4. Exam reveals no friction rub.    No murmur heard.  Pulmonary/Chest: Breath sounds normal. She has no wheezes. She has no rales.   Abdominal: Soft. Bowel sounds are normal. There is no hepatosplenomegaly. There is no tenderness.   Musculoskeletal: She exhibits no edema.   Neurological: She is alert and oriented to person, place, and time. She has normal strength.   Skin: No cyanosis. Nails show no clubbing.         Lab Results   Component Value Date     04/20/2017    K 4.9 04/20/2017    BUN 17 04/20/2017    CREATININE 1.0 04/20/2017     (H) 04/20/2017    HGBA1C 5.9 04/20/2017    CHOL 266 (H) 04/20/2017    HDL 57 04/20/2017    LDLCALC 142.6 04/20/2017    TRIG 332 (H) 04/20/2017    CHOLHDL 21.4 04/20/2017    HGB 14.5 04/20/2017    HCT 44.1 04/20/2017     04/20/2017    INR 0.9 06/10/2016       Assessment:     1. History of palpitations    2. Benign essential hypertension    3. Hyperlipidemia, unspecified hyperlipidemia type         Overall, patient is doing well.  She continues to have transient short duration palpitations.  They may be supraventricular tachycardia, it is possible it is also atrial fibrillation.  However, as the patient will be moving to [place] at the end of the month.  No additional workup will be performed at this time.  Given that her blood pressure remains elevated, her metoprolol be increased to 100 mg daily.  The patient will also be starting on atorvastatin and further increase and that medication will depend on the response of her LDL cholesterol.  Patient will contact me once she is in [place] and referral will be made to a cardiologist in [facility].  Plan:     Soumya was seen today for benign essential hypertension.    Diagnoses and all orders for this visit:    History of palpitations    Benign essential hypertension    Hyperlipidemia, unspecified  hyperlipidemia type

## 2017-05-01 NOTE — Clinical Note
Thank you for referring Soumya Stoll for follow-up of palpitations and hypertension. Please see my note for details of this encounter. If you have any questions, please contact me.  Thank you again for the referral.

## 2017-05-01 NOTE — MR AVS SNAPSHOT
[facility]  [address]  [place] [address]  Phone: [phone]                  Soumya Stoll   2017 1:00 PM   Office Visit    Description:  Female : 1950   Provider:  Clarke Bernstein MD   Department:  [facility]           Reason for Visit     Benign essential hypertension           Diagnoses this Visit        Comments    History of palpitations    -  Primary     Benign essential hypertension         Hyperlipidemia, unspecified hyperlipidemia type                To Do List           Goals (5 Years of Data)     None       These Medications        Disp Refills Start End    metoprolol succinate (TOPROL-XL) 100 MG 24 hr tablet 90 tablet 3 2017     Take 1 tablet (100 mg total) by mouth once daily. TAKE 1 TABLET TWICE A DAY - Oral    Pharmacy: [facility] [place] [address]  #: [phone]         [facility]     [facility] Care Line -  Assistance  Unless otherwise directed by your provider, please contact [facility], our nurse care line that is available for  assistance.     Registered nurses in the [facility] provide: appointment scheduling, clinical advisement, health education, and other advisory services.  Call: [phone] (toll free)               Medications           Message regarding Medications     Verify the changes and/or additions to your medication regime listed below are the same as discussed with your clinician today.  If any of these changes or additions are incorrect, please notify your healthcare provider.        CHANGE how you are taking these medications     Start Taking Instead of    metoprolol succinate (TOPROL-XL) 100 MG 24 hr tablet metoprolol succinate (TOPROL XL) 50 MG 24 hr tablet    Dosage:  Take 1 tablet (100 mg total) by mouth once daily. TAKE 1 TABLET TWICE A DAY Dosage:  TAKE 1 TABLET TWICE A DAY    Reason for Change:  Reorder           "  Verify that the below list of medications is an accurate representation of the medications you are currently taking.  If none reported, the list may be blank. If incorrect, please contact your healthcare provider. Carry this list with you in case of emergency.           Current Medications     amitriptyline (ELAVIL) 10 MG tablet Take 1 tablet (10 mg total) by mouth nightly as needed for Insomnia.    atorvastatin (LIPITOR) 40 MG tablet Take 1 tablet (40 mg total) by mouth once daily.    metoprolol succinate (TOPROL-XL) 100 MG 24 hr tablet Take 1 tablet (100 mg total) by mouth once daily. TAKE 1 TABLET TWICE A DAY           Clinical Reference Information           Your Vitals Were     BP Pulse Height Weight BMI    134/87 (BP Location: Left arm, Patient Position: Sitting, BP Method: Automatic) 78 5' 6" (1.676 m) 91.2 kg (201 lb 1 oz) 32.45 kg/m2      Blood Pressure          Most Recent Value    Right Arm BP - Sitting  133/89    Left Arm BP - Sitting  134/87    BP  134/87      Allergies as of 5/1/2017     No Known Allergies      Immunizations Administered on Date of Encounter - 5/1/2017     None      Language Assistance Services     ATTENTION: Language assistance services are available, free of charge. Please call [phone].      ATENCIÓN: Si habla hemal, tiene a craig disposición servicios gratuitos de asistencia lingüística. Llame al [phone].     TALHA Ý: N?u b?n nói Ti?ng Vi?t, có các d?ch v? h? tr? ngôn ng? mi?n phí dành cho b?n. G?i s? [phone].         [facility] complies with applicable Federal civil rights laws and does not discriminate on the basis of race, color, national origin, age, disability, or sex.        "

## 2017-05-16 RX ORDER — METOPROLOL SUCCINATE 100 MG/1
100 TABLET, EXTENDED RELEASE ORAL DAILY
Qty: 180 TABLET | Refills: 3 | Status: SHIPPED | OUTPATIENT
Start: 2017-05-16

## 2017-05-18 ENCOUNTER — PATIENT MESSAGE (OUTPATIENT)
Dept: INTERNAL MEDICINE | Facility: CLINIC | Age: 67
End: 2017-05-18

## 2017-05-18 DIAGNOSIS — G89.29 CHRONIC PAIN OF BOTH SHOULDERS: ICD-10-CM

## 2017-05-18 DIAGNOSIS — M25.511 CHRONIC PAIN OF BOTH SHOULDERS: ICD-10-CM

## 2017-05-18 DIAGNOSIS — M25.512 CHRONIC PAIN OF BOTH SHOULDERS: ICD-10-CM

## 2017-05-18 DIAGNOSIS — G47.00 INSOMNIA, UNSPECIFIED TYPE: ICD-10-CM

## 2017-05-18 RX ORDER — AMITRIPTYLINE HYDROCHLORIDE 10 MG/1
10 TABLET, FILM COATED ORAL NIGHTLY PRN
Qty: 90 TABLET | Refills: 0 | Status: SHIPPED | OUTPATIENT
Start: 2017-05-18 | End: 2018-05-18